# Patient Record
Sex: MALE | Race: AMERICAN INDIAN OR ALASKA NATIVE
[De-identification: names, ages, dates, MRNs, and addresses within clinical notes are randomized per-mention and may not be internally consistent; named-entity substitution may affect disease eponyms.]

---

## 2019-12-30 ENCOUNTER — HOSPITAL ENCOUNTER (EMERGENCY)
Dept: HOSPITAL 5 - ED | Age: 34
Discharge: LEFT BEFORE DISCHARGE | End: 2019-12-30
Payer: SELF-PAY

## 2019-12-30 VITALS — DIASTOLIC BLOOD PRESSURE: 77 MMHG | SYSTOLIC BLOOD PRESSURE: 114 MMHG

## 2019-12-30 DIAGNOSIS — Z53.21: ICD-10-CM

## 2019-12-30 DIAGNOSIS — K08.89: Primary | ICD-10-CM

## 2019-12-30 NOTE — EMERGENCY DEPARTMENT REPORT
Blank Doc





- Documentation


Documentation: 





Patient was seen by me for a RN triage only.  Patient left before being examin

ed, treated, or diagnosis.  When entering the room patient was not there.  RN 

tried to contact patient several times with no success.  As per RN, patient was 

clearly instructed by RN that if patient leaves without being fully evaluated 

and treated that the condition may be serious and/or life threatening if not 

treated.  Patient left without being seen.